# Patient Record
Sex: FEMALE | Race: WHITE | NOT HISPANIC OR LATINO
[De-identification: names, ages, dates, MRNs, and addresses within clinical notes are randomized per-mention and may not be internally consistent; named-entity substitution may affect disease eponyms.]

---

## 2022-07-20 PROBLEM — Z00.00 ENCOUNTER FOR PREVENTIVE HEALTH EXAMINATION: Status: ACTIVE | Noted: 2022-07-20

## 2022-08-11 ENCOUNTER — RESULT REVIEW (OUTPATIENT)
Age: 32
End: 2022-08-11

## 2022-08-11 ENCOUNTER — APPOINTMENT (OUTPATIENT)
Dept: HEMATOLOGY ONCOLOGY | Facility: CLINIC | Age: 32
End: 2022-08-11

## 2022-08-11 VITALS
RESPIRATION RATE: 16 BRPM | HEIGHT: 64 IN | SYSTOLIC BLOOD PRESSURE: 126 MMHG | WEIGHT: 116.3 LBS | DIASTOLIC BLOOD PRESSURE: 77 MMHG | BODY MASS INDEX: 19.85 KG/M2 | OXYGEN SATURATION: 100 % | HEART RATE: 75 BPM | TEMPERATURE: 97.6 F

## 2022-08-11 DIAGNOSIS — R59.1 GENERALIZED ENLARGED LYMPH NODES: ICD-10-CM

## 2022-08-11 DIAGNOSIS — Z80.3 FAMILY HISTORY OF MALIGNANT NEOPLASM OF BREAST: ICD-10-CM

## 2022-08-11 DIAGNOSIS — F43.10 POST-TRAUMATIC STRESS DISORDER, UNSPECIFIED: ICD-10-CM

## 2022-08-11 DIAGNOSIS — Z78.9 OTHER SPECIFIED HEALTH STATUS: ICD-10-CM

## 2022-08-11 DIAGNOSIS — Z87.39 PERSONAL HISTORY OF OTHER DISEASES OF THE MUSCULOSKELETAL SYSTEM AND CONNECTIVE TISSUE: ICD-10-CM

## 2022-08-11 PROCEDURE — 99205 OFFICE O/P NEW HI 60 MIN: CPT | Mod: 25

## 2022-08-11 PROCEDURE — 36415 COLL VENOUS BLD VENIPUNCTURE: CPT

## 2022-08-11 RX ORDER — ERYTHROMYCIN 5 MG/G
5 OINTMENT OPHTHALMIC
Qty: 4 | Refills: 0 | Status: COMPLETED | COMMUNITY
Start: 2022-08-09

## 2022-08-11 RX ORDER — TRIAMCINOLONE ACETONIDE 5 MG/G
0.5 CREAM TOPICAL
Qty: 30 | Refills: 0 | Status: COMPLETED | COMMUNITY
Start: 2022-07-12

## 2022-08-11 RX ORDER — CHOLESTYRAMINE 4 G/9G
4 POWDER, FOR SUSPENSION ORAL
Qty: 60 | Refills: 0 | Status: COMPLETED | COMMUNITY
Start: 2022-06-08

## 2022-08-11 RX ORDER — SULFAMETHOXAZOLE AND TRIMETHOPRIM 800; 160 MG/1; MG/1
800-160 TABLET ORAL
Qty: 20 | Refills: 0 | Status: COMPLETED | COMMUNITY
Start: 2022-04-29

## 2022-08-11 NOTE — REVIEW OF SYSTEMS
[Fatigue] : fatigue [Muscle Pain] : muscle pain [Negative] : Allergic/Immunologic [de-identified] : numbness on right toes

## 2022-08-11 NOTE — HISTORY OF PRESENT ILLNESS
[de-identified] : Ms. Ashley Lima is 30 year old with lymphadenopathy here for consultation. \par \par Patient with hx of fibromyalgia, LCPD (hip dysplasia/replacement), PTSD (depression/anxiety), b12 deficiency who has been having hard and nontender lymphadenopathy on b/l groins on neck, seen by \par Patient has MS like symptoms (memory loss, words recall, numbness on right toes, drop things on left hands, tingling sensation) \par She has biopsy on  lower eyelid (L) on 8/2/22 - pathology is still pending. \par Cholecystectomy in Jan 2022\par Menses are regular - 3-4 days monthly \par \par Abdominal US (5/2022)\par Echogenic lesion in the left lobe of the liver measuring 2.2 x 2.4 cm, represent a hemangioma. \par \par She has ovarian cysts. \par \par Sensitivity to narcotics but no real allergies. \par \par FHx:\par P. Grandmother with breast cancer in her 50s\par no hematological disorders\par \par Social History\par Uses medical marijuana daily\par Alcohol - rare\par Work - at home with her son\par \par Patent is overly stressed, sleeping too much and poor appetite.  Lost 10 lbs since Jan 2022 Cholecystectomy \par She denies of having fever but does sweat more at night than usual.

## 2022-08-11 NOTE — CONSULT LETTER
[Consult Letter:] : I had the pleasure of evaluating your patient, [unfilled]. [Please see my note below.] : Please see my note below. [Sincerely,] : Sincerely, [Dear  ___] : Dear ~STEPHAN, [FreeTextEntry3] : Anthony Bose MD, MPH\par Attending Physician\par Hematology Oncology\par [facility] [place]\par [facility]\par

## 2022-08-11 NOTE — PHYSICAL EXAM
[Restricted in physically strenuous activity but ambulatory and able to carry out work of a light or sedentary nature] : Status 1- Restricted in physically strenuous activity but ambulatory and able to carry out work of a light or sedentary nature, e.g., light house work, office work [Normal] : affect appropriate [de-identified] : ~ [de-identified] : ~5mm x 4 mm left cervical lymph node below the jawline [de-identified] : Palpable bilateral inguinal lymphadenopathy- right 8 mm x 10 mm, left 11 mm x 12mm

## 2022-08-11 NOTE — ASSESSMENT
[FreeTextEntry1] : Bilateral groin LN x 3 months\par Left cervical node- felt 2 weeks ago\par Asymptomatic, unchanged \par No fevers, \par Has been having night sweats intermittently x unsure of the duration\par Lost 12 lbs since January 2022. Had cholecystectomy due to pain/ suspected GB paresis\par USG (6/2022)- Several subcm inguinal lymph nodes bilaterally\par H/o fibromyalgia x 20 years. \par Also she follows with neurology who suspects MS but work up ongoing\par Outside labs, ultrasound reviewed, analyzed and discussed\par Discussed about differential diagnosis including inflammation, infection, autoimmune or malignant diseases\par Given the night sweats, weight loss and painless persistent lymphadenopathy- concern for lymphoma \par Obtain PET/CT \par Based on the PET findings, we will decide the ideal site for biopsy\par Send blood work including CBC, CMP, LDH, ESR, CRP, myeloma panel\par She reports that infectious panel was sent by her PCP\par \par Family Hx of cancer\par P GM- breast cancer in her 50s-60s\par \par Social hx\par Lives with Matthew with  and son\par Stay at home \par Non smoker\par Occasional alcohol intake\par \par Patient had multiple questions which were answered to satisfaction\par \par Follow up in 3 weeks\par No labs

## 2022-08-31 ENCOUNTER — NON-APPOINTMENT (OUTPATIENT)
Age: 32
End: 2022-08-31

## 2022-09-01 ENCOUNTER — APPOINTMENT (OUTPATIENT)
Dept: HEMATOLOGY ONCOLOGY | Facility: CLINIC | Age: 32
End: 2022-09-01

## 2022-09-06 ENCOUNTER — NON-APPOINTMENT (OUTPATIENT)
Age: 32
End: 2022-09-06

## 2022-09-14 ENCOUNTER — APPOINTMENT (OUTPATIENT)
Dept: HEMATOLOGY ONCOLOGY | Facility: CLINIC | Age: 32
End: 2022-09-14

## 2022-09-22 ENCOUNTER — APPOINTMENT (OUTPATIENT)
Dept: HEMATOLOGY ONCOLOGY | Facility: CLINIC | Age: 32
End: 2022-09-22

## 2022-09-22 ENCOUNTER — NON-APPOINTMENT (OUTPATIENT)
Age: 32
End: 2022-09-22

## 2022-10-12 ENCOUNTER — NON-APPOINTMENT (OUTPATIENT)
Age: 32
End: 2022-10-12

## 2022-10-27 ENCOUNTER — APPOINTMENT (OUTPATIENT)
Dept: HEMATOLOGY ONCOLOGY | Facility: CLINIC | Age: 32
End: 2022-10-27

## 2022-10-27 VITALS
RESPIRATION RATE: 16 BRPM | OXYGEN SATURATION: 100 % | BODY MASS INDEX: 20.83 KG/M2 | WEIGHT: 122 LBS | HEIGHT: 64 IN | DIASTOLIC BLOOD PRESSURE: 75 MMHG | HEART RATE: 78 BPM | TEMPERATURE: 98.2 F | SYSTOLIC BLOOD PRESSURE: 117 MMHG

## 2022-10-27 DIAGNOSIS — R59.0 LOCALIZED ENLARGED LYMPH NODES: ICD-10-CM

## 2022-10-27 PROCEDURE — 99214 OFFICE O/P EST MOD 30 MIN: CPT | Mod: 25

## 2022-10-27 NOTE — ASSESSMENT
[FreeTextEntry1] : Bilateral groin LN x 5 months\par Left cervical node x 3 months\par Asymptomatic, unchanged \par No fevers, \par Has been having night sweats intermittently x unsure of the duration\par Lost 12 lbs since January 2022. Had cholecystectomy due to pain/ suspected GB paresis\par USG (6/2022)- Several subcm inguinal lymph nodes bilaterally\par H/o fibromyalgia x 20 years. \par Also she follows with neurology who suspects MS but work up ongoing\par \par SHe is upset as none of her imaging studies have been approved by her insurance as yet\par We attempted to get PET/CT which was declined. CT NCAP was declined unless there is a LN biopsy\par She continues to have occasional stomach pain, hip pain but is not sure if it is related to her fibromyalgia\par She continues to feel the neck and inguinal LN which have not changed in size\par Has always had night sweats but no fevers, weight loss\par \par Request CT NCAP with and without contrast with updated labs and ultrasound showing inguinal adenopathy\par If declined, we can consider alternate options including biopsy\par She is concerned about invasive measures as it can worsen her fibromyalgia pain\par \par Family Hx of cancer\par P GM- breast cancer in her 50s-60s\par \par Social hx\par Lives with Matthew with  and son\par Stay at home \par Non smoker\par Occasional alcohol intake\par \par Patient had multiple questions which were answered to satisfaction\par \par Follow up with imaging\par No labs

## 2022-10-27 NOTE — REVIEW OF SYSTEMS
[Fatigue] : fatigue [Muscle Pain] : muscle pain [Negative] : Allergic/Immunologic [de-identified] : numbness on right toes

## 2022-10-27 NOTE — PHYSICAL EXAM
[Restricted in physically strenuous activity but ambulatory and able to carry out work of a light or sedentary nature] : Status 1- Restricted in physically strenuous activity but ambulatory and able to carry out work of a light or sedentary nature, e.g., light house work, office work [Normal] : affect appropriate [de-identified] : ~5mm x 4 mm left cervical lymph node below the jawline [de-identified] : Palpable bilateral inguinal lymphadenopathy- right 8 mm x 10 mm, left 11 mm x 12mm

## 2022-10-27 NOTE — HISTORY OF PRESENT ILLNESS
[de-identified] : Ms. Ashley Lima is 30 year old with lymphadenopathy here for consultation. \par \par Patient with hx of fibromyalgia, LCPD (hip dysplasia/replacement), PTSD (depression/anxiety), b12 deficiency who has been having hard and nontender lymphadenopathy on b/l groins on neck, seen by \par Patient has MS like symptoms (memory loss, words recall, numbness on right toes, drop things on left hands, tingling sensation) \par She has biopsy on  lower eyelid (L) on 8/2/22 - pathology is still pending. \par Cholecystectomy in Jan 2022\par Menses are regular - 3-4 days monthly \par \par Abdominal US (5/2022)\par Echogenic lesion in the left lobe of the liver measuring 2.2 x 2.4 cm, represent a hemangioma. \par \par She has ovarian cysts. \par \par Sensitivity to narcotics but no real allergies. \par \par FHx:\par P. Grandmother with breast cancer in her 50s\par no hematological disorders\par \par Social History\par Uses medical marijuana daily\par Alcohol - rare\par Work - at home with her son\par \par Patent is overly stressed, sleeping too much and poor appetite.  Lost 10 lbs since Jan 2022 Cholecystectomy \par She denies of having fever but does sweat more at night than usual.  [de-identified] : Patient is seen today for follow up\par \par SHe is upset as none of her imaging studies have been approved by her insurance as yet\par We attempted to get PET/CT which was declined. CT NCAP was declined unless there is a LN biopsy\par \par She continues to have occasional stomach pain, hip pain but is not sure if it is related to her fibromyalgia\par She continues to feel the neck and inguinal LN which have not changed in size\par Has always had night sweats but no fevers, weight loss

## 2022-10-27 NOTE — CONSULT LETTER
[Dear  ___] : Dear ~STEPHAN, [Consult Letter:] : I had the pleasure of evaluating your patient, [unfilled]. [Please see my note below.] : Please see my note below. [Sincerely,] : Sincerely, [FreeTextEntry3] : Anthony Bose MD, MPH\par Attending Physician\par Hematology Oncology\par [facility] [place]\par [facility]\par

## 2022-12-01 ENCOUNTER — RESULT REVIEW (OUTPATIENT)
Age: 32
End: 2022-12-01

## 2022-12-09 ENCOUNTER — APPOINTMENT (OUTPATIENT)
Dept: HEMATOLOGY ONCOLOGY | Facility: CLINIC | Age: 32
End: 2022-12-09

## 2022-12-09 DIAGNOSIS — R59.0 LOCALIZED ENLARGED LYMPH NODES: ICD-10-CM

## 2022-12-09 DIAGNOSIS — E53.8 DEFICIENCY OF OTHER SPECIFIED B GROUP VITAMINS: ICD-10-CM

## 2022-12-09 NOTE — PHYSICAL EXAM
[Restricted in physically strenuous activity but ambulatory and able to carry out work of a light or sedentary nature] : Status 1- Restricted in physically strenuous activity but ambulatory and able to carry out work of a light or sedentary nature, e.g., light house work, office work [Normal] : affect appropriate [de-identified] : ~5mm x 4 mm left cervical lymph node below the jawline [de-identified] : Palpable bilateral inguinal lymphadenopathy- right 8 mm x 10 mm, left 11 mm x 12mm

## 2022-12-12 ENCOUNTER — RESULT REVIEW (OUTPATIENT)
Age: 32
End: 2022-12-12

## 2022-12-12 ENCOUNTER — APPOINTMENT (OUTPATIENT)
Dept: HEMATOLOGY ONCOLOGY | Facility: CLINIC | Age: 32
End: 2022-12-12

## 2022-12-12 VITALS
SYSTOLIC BLOOD PRESSURE: 113 MMHG | OXYGEN SATURATION: 100 % | TEMPERATURE: 97.5 F | HEART RATE: 67 BPM | HEIGHT: 64 IN | RESPIRATION RATE: 16 BRPM | DIASTOLIC BLOOD PRESSURE: 68 MMHG

## 2022-12-16 ENCOUNTER — APPOINTMENT (OUTPATIENT)
Dept: HEMATOLOGY ONCOLOGY | Facility: CLINIC | Age: 32
End: 2022-12-16

## 2022-12-16 DIAGNOSIS — R61 GENERALIZED HYPERHIDROSIS: ICD-10-CM

## 2022-12-16 PROCEDURE — 99214 OFFICE O/P EST MOD 30 MIN: CPT | Mod: 25,95

## 2022-12-19 PROBLEM — R61 NIGHT SWEATS: Status: ACTIVE | Noted: 2022-12-19

## 2022-12-19 NOTE — ASSESSMENT
[FreeTextEntry1] : Bilateral groin LN x 5 months\par Left cervical node x 3 months\par Asymptomatic, unchanged \par No fevers, \par Has been having night sweats intermittently x unsure of the duration\par Lost 12 lbs since January 2022. Had cholecystectomy due to pain/ suspected GB paresis\par USG (6/2022)- Several subcm inguinal lymph nodes bilaterally\par H/o fibromyalgia x 20 years. \par Also she follows with neurology who suspects MS but work up ongoing\par CT N/C/A/P without any lymphadenopathy. Does mention hepatomegaly\par Reassured.\par She still feels the inguinal LN. Repeat USG in 6 months to reassess hepatomegaly and LN\par \par Rectosigmoid thickening\par Had colonoscopy with Dr Chua/Dr Guajardo in 2021\par Denies any BRBPR, rectal pain\par \par DEION\par On oral iron supplements\par \par Prominent B/L medullary pyramids\par Labs show normal BUN creatinine\par \par Family Hx of cancer\par P GM- breast cancer in her 50s-60s\par \par Social hx\par Lives with Matthew with  and son\par Stay at home \par Non smoker\par Occasional alcohol intake\par \par Patient had multiple questions which were answered to satisfaction\par \par Labs in 6 months- CBC, CMP, iron sat, ferritin, LDH\par OV few days later

## 2022-12-19 NOTE — HISTORY OF PRESENT ILLNESS
[Home] : at home, [unfilled] , at the time of the visit. [Medical Office: ([facility])___] : at the medical office located in  [Verbal consent obtained from patient] : the patient, [unfilled] [de-identified] : Ms. Ashley Lima is 30 year old with lymphadenopathy here for consultation. \par \par Patient with hx of fibromyalgia, LCPD (hip dysplasia/replacement), PTSD (depression/anxiety), b12 deficiency who has been having hard and nontender lymphadenopathy on b/l groins on neck, seen by \par Patient has MS like symptoms (memory loss, words recall, numbness on right toes, drop things on left hands, tingling sensation) \par She has biopsy on  lower eyelid (L) on 8/2/22 - pathology is still pending. \par Cholecystectomy in Jan 2022\par Menses are regular - 3-4 days monthly \par \par Abdominal US (5/2022)\par Echogenic lesion in the left lobe of the liver measuring 2.2 x 2.4 cm, represent a hemangioma. \par \par She has ovarian cysts. \par \par Sensitivity to narcotics but no real allergies. \par \par FHx:\par P. Grandmother with breast cancer in her 50s\par no hematological disorders\par \par Social History\par Uses medical marijuana daily\par Alcohol - rare\par Work - at home with her son\par \par Patent is overly stressed, sleeping too much and poor appetite.  Lost 10 lbs since Jan 2022 Cholecystectomy \par She denies of having fever but does sweat more at night than usual.  [de-identified] : Telemedicine (video, audio) done today for follow up\par Consent obtained from the patient\par \par No new symptoms\par She continues to have occasional stomach pain, hip pain but is not sure if it is related to her fibromyalgia\par She continues to feel the neck and inguinal LN which have not changed in size\par Has always had night sweats but no fevers, weight loss\par \par CT neck (12/1/22)\par No enhancing soft tissue lesion identified within the aerodigestive tract, thyroid gland and salivary glands.\par No cervical adenopathy identified by CT size criteria\par No lymphadenopathy \par Hepatomegaly\par Rectosigmoid wall thickening versus underdistention, correlate clinically to exclude mild proctocolitis.\par Prominent enhancement of all bilateral medullary pyramids in symmetrical fashion which may be secondary to the phase of contrast enhancement, however cannot exclude early medullary sponge kidney. Recommend correlation with laboratory studies as well as ultrasound and/or CT urogram for further evaluation.\par \par

## 2022-12-19 NOTE — CONSULT LETTER
[Dear  ___] : Dear ~STEPHAN, [Consult Letter:] : I had the pleasure of evaluating your patient, [unfilled]. [Please see my note below.] : Please see my note below. [Sincerely,] : Sincerely, [FreeTextEntry3] : Anthony Bose MD, MPH\par Attending Physician\par Hematology Oncology\par [facility] [place]\par [facility]\par

## 2022-12-19 NOTE — REVIEW OF SYSTEMS
[Fatigue] : fatigue [Muscle Pain] : muscle pain [Negative] : Allergic/Immunologic [de-identified] : numbness on right toes

## 2023-06-23 ENCOUNTER — APPOINTMENT (OUTPATIENT)
Dept: HEMATOLOGY ONCOLOGY | Facility: CLINIC | Age: 33
End: 2023-06-23

## 2025-03-02 ENCOUNTER — NON-APPOINTMENT (OUTPATIENT)
Age: 35
End: 2025-03-02